# Patient Record
Sex: FEMALE | Race: WHITE | NOT HISPANIC OR LATINO | URBAN - METROPOLITAN AREA
[De-identification: names, ages, dates, MRNs, and addresses within clinical notes are randomized per-mention and may not be internally consistent; named-entity substitution may affect disease eponyms.]

---

## 2019-11-07 ENCOUNTER — ANNUAL EXAM (OUTPATIENT)
Dept: OBGYN CLINIC | Facility: CLINIC | Age: 59
End: 2019-11-07
Payer: COMMERCIAL

## 2019-11-07 VITALS
DIASTOLIC BLOOD PRESSURE: 88 MMHG | SYSTOLIC BLOOD PRESSURE: 126 MMHG | WEIGHT: 192 LBS | HEIGHT: 64 IN | BODY MASS INDEX: 32.78 KG/M2

## 2019-11-07 DIAGNOSIS — Z12.39 BREAST CANCER SCREENING: ICD-10-CM

## 2019-11-07 DIAGNOSIS — Z01.419 ENCOUNTER FOR ANNUAL ROUTINE GYNECOLOGICAL EXAMINATION: Primary | ICD-10-CM

## 2019-11-07 PROCEDURE — 87624 HPV HI-RISK TYP POOLED RSLT: CPT | Performed by: OBSTETRICS & GYNECOLOGY

## 2019-11-07 PROCEDURE — S0610 ANNUAL GYNECOLOGICAL EXAMINA: HCPCS | Performed by: OBSTETRICS & GYNECOLOGY

## 2019-11-07 PROCEDURE — G0145 SCR C/V CYTO,THINLAYER,RESCR: HCPCS | Performed by: OBSTETRICS & GYNECOLOGY

## 2019-11-07 RX ORDER — CETIRIZINE HYDROCHLORIDE 10 MG/1
10 TABLET ORAL DAILY
COMMUNITY
Start: 2018-03-23

## 2019-11-07 RX ORDER — OLOPATADINE HYDROCHLORIDE 665 UG/1
2 SPRAY NASAL 2 TIMES DAILY
COMMUNITY
Start: 2019-04-23 | End: 2021-11-04

## 2019-11-07 RX ORDER — VALACYCLOVIR HYDROCHLORIDE 500 MG/1
500 TABLET, FILM COATED ORAL
COMMUNITY
Start: 2017-09-01

## 2019-11-07 RX ORDER — FLUOXETINE HYDROCHLORIDE 20 MG/1
CAPSULE ORAL
COMMUNITY
Start: 2019-10-23

## 2019-11-07 NOTE — PROGRESS NOTES
Assessment/Plan:  Pap smear done as well as annual   Encouraged self-breast examination as well as calcium supplementation  Continue annual 3D mammogram   Reviewed over-the-counter agents for vaginal atrophy, symptomatic  All questions answered  She will continue to follow-up with her family physician as scheduled  Return to office in 1 year or p r n  No problem-specific Assessment & Plan notes found for this encounter  Diagnoses and all orders for this visit:    Encounter for annual routine gynecological examination  -     Liquid-based pap, screening    Breast cancer screening  -     Mammo screening bilateral w 3d & cad; Future    Other orders  -     FLUoxetine (PROzac) 20 mg capsule  -     valACYclovir (VALTREX) 500 mg tablet; Take 500 mg by mouth  -     Olopatadine HCl 0 6 % SOLN; 2 sprays into each nostril 2 (two) times a day  -     PROCTOSOL HC 2 5 % rectal cream  -     hydrocortisone (ANUSOL-HC) 2 5 % rectal cream; Apply twice a day, as needed  -     cetirizine (ZYRTEC ALLERGY) 10 mg tablet; Take 10 mg by mouth daily          Subjective:      Patient ID: Lena Grayson is a 61 y o  female  HPI     This is a 66-year-old female  presents as a new patient for annual well gyn exam   Patient went through menopause at age 46  She denies any vaginal bleeding or spotting  She was never on hormone replacement therapy  She states she started birth control pills at age 25 and discontinued at age 46  Patient has been in a monogamous relationship for over 21 years  She states her Pap smears have been normal   Last gyn exam was approximately 2 years ago  She does state in her early 21 she underwent cryo surgery  Her gyn history significant for laparoscopy x2, diagnosed with endometriosis in her 25s  She also was diagnosed with interstitial cystitis well over 10 years ago and was using Elmiron instillation with self catheterization  This was prescribed through Dr Sandeep King    She last was treated 2 years ago  Patient does complain of vaginal dryness during intercourse  She does use a lubricant periodically  She underwent a screening colonoscopy 2018 revealing polyp with follow-up in 3 years  She does follow up with her family physician on a regular basis  The following portions of the patient's history were reviewed and updated as appropriate: allergies, current medications, past family history, past medical history, past social history, past surgical history and problem list     Review of Systems   Constitutional: Negative for fatigue, fever and unexpected weight change  Respiratory: Negative for cough, chest tightness, shortness of breath and wheezing  Cardiovascular: Negative  Negative for chest pain and palpitations  Gastrointestinal: Negative  Negative for abdominal distention, abdominal pain, blood in stool, constipation, diarrhea, nausea and vomiting  Genitourinary: Negative  Negative for difficulty urinating, dyspareunia, dysuria, flank pain, frequency, genital sores, hematuria, pelvic pain, urgency, vaginal bleeding, vaginal discharge and vaginal pain  Skin: Negative for rash  Objective:      /88   Ht 5' 4" (1 626 m)   Wt 87 1 kg (192 lb)   Breastfeeding? No   BMI 32 96 kg/m²          Physical Exam   Constitutional: She appears well-developed and well-nourished  Cardiovascular: Normal rate and regular rhythm  Pulmonary/Chest: Effort normal and breath sounds normal  Right breast exhibits no inverted nipple, no mass, no nipple discharge, no skin change and no tenderness  Left breast exhibits no inverted nipple, no mass, no nipple discharge, no skin change and no tenderness  Abdominal: Soft  Bowel sounds are normal  She exhibits no distension  There is no tenderness  There is no rebound and no guarding  Genitourinary: Vagina normal and uterus normal  There is no lesion on the right labia  There is no lesion on the left labia   Cervix exhibits no discharge and no friability  Right adnexum displays no mass, no tenderness and no fullness  Left adnexum displays no mass, no tenderness and no fullness  No vaginal discharge found  Genitourinary Comments: Cervix is noted to be slightly distorted  Cervical os is stenotic  There is no evidence of pelvic floor prolapse  Rectovaginal exam is confirmatory

## 2019-11-11 LAB
HPV HR 12 DNA CVX QL NAA+PROBE: NEGATIVE
HPV16 DNA CVX QL NAA+PROBE: NEGATIVE
HPV18 DNA CVX QL NAA+PROBE: NEGATIVE

## 2019-11-13 LAB
LAB AP GYN PRIMARY INTERPRETATION: NORMAL
Lab: NORMAL

## 2020-12-08 ENCOUNTER — ANNUAL EXAM (OUTPATIENT)
Dept: OBGYN CLINIC | Facility: CLINIC | Age: 60
End: 2020-12-08
Payer: COMMERCIAL

## 2020-12-08 VITALS
HEIGHT: 64 IN | WEIGHT: 199 LBS | SYSTOLIC BLOOD PRESSURE: 138 MMHG | BODY MASS INDEX: 33.97 KG/M2 | DIASTOLIC BLOOD PRESSURE: 82 MMHG

## 2020-12-08 DIAGNOSIS — Z12.39 ENCOUNTER FOR SCREENING FOR MALIGNANT NEOPLASM OF BREAST, UNSPECIFIED SCREENING MODALITY: ICD-10-CM

## 2020-12-08 DIAGNOSIS — Z01.419 ENCOUNTER FOR ANNUAL ROUTINE GYNECOLOGICAL EXAMINATION: Primary | ICD-10-CM

## 2020-12-08 PROCEDURE — 87624 HPV HI-RISK TYP POOLED RSLT: CPT | Performed by: OBSTETRICS & GYNECOLOGY

## 2020-12-08 PROCEDURE — S0612 ANNUAL GYNECOLOGICAL EXAMINA: HCPCS | Performed by: OBSTETRICS & GYNECOLOGY

## 2020-12-08 PROCEDURE — G0145 SCR C/V CYTO,THINLAYER,RESCR: HCPCS | Performed by: OBSTETRICS & GYNECOLOGY

## 2020-12-11 LAB
LAB AP GYN PRIMARY INTERPRETATION: NORMAL
Lab: NORMAL

## 2021-11-05 ENCOUNTER — TELEPHONE (OUTPATIENT)
Dept: HEMATOLOGY ONCOLOGY | Facility: CLINIC | Age: 61
End: 2021-11-05

## 2021-11-08 ENCOUNTER — TELEPHONE (OUTPATIENT)
Dept: HEMATOLOGY ONCOLOGY | Facility: CLINIC | Age: 61
End: 2021-11-08

## 2021-11-17 ENCOUNTER — CONSULT (OUTPATIENT)
Dept: HEMATOLOGY ONCOLOGY | Facility: CLINIC | Age: 61
End: 2021-11-17
Payer: COMMERCIAL

## 2021-11-17 ENCOUNTER — APPOINTMENT (OUTPATIENT)
Dept: LAB | Age: 61
End: 2021-11-17
Payer: COMMERCIAL

## 2021-11-17 VITALS
HEART RATE: 70 BPM | SYSTOLIC BLOOD PRESSURE: 136 MMHG | BODY MASS INDEX: 34.42 KG/M2 | TEMPERATURE: 97.5 F | HEIGHT: 64 IN | OXYGEN SATURATION: 97 % | WEIGHT: 201.6 LBS | RESPIRATION RATE: 16 BRPM | DIASTOLIC BLOOD PRESSURE: 88 MMHG

## 2021-11-17 DIAGNOSIS — M32.9 LUPUS (HCC): ICD-10-CM

## 2021-11-17 DIAGNOSIS — D69.2 PURPURA (HCC): ICD-10-CM

## 2021-11-17 DIAGNOSIS — Z86.2: Primary | ICD-10-CM

## 2021-11-17 DIAGNOSIS — R76.8 ELEVATED ANTINUCLEAR ANTIBODY (ANA) LEVEL: ICD-10-CM

## 2021-11-17 LAB
ALBUMIN SERPL BCP-MCNC: 3.6 G/DL (ref 3.5–5)
ALP SERPL-CCNC: 106 U/L (ref 46–116)
ALT SERPL W P-5'-P-CCNC: 29 U/L (ref 12–78)
ANION GAP SERPL CALCULATED.3IONS-SCNC: 5 MMOL/L (ref 4–13)
AST SERPL W P-5'-P-CCNC: 20 U/L (ref 5–45)
BASOPHILS # BLD AUTO: 0.06 THOUSANDS/ΜL (ref 0–0.1)
BASOPHILS NFR BLD AUTO: 1 % (ref 0–1)
BILIRUB SERPL-MCNC: 0.56 MG/DL (ref 0.2–1)
BUN SERPL-MCNC: 19 MG/DL (ref 5–25)
CALCIUM SERPL-MCNC: 9.1 MG/DL (ref 8.3–10.1)
CHLORIDE SERPL-SCNC: 106 MMOL/L (ref 100–108)
CO2 SERPL-SCNC: 26 MMOL/L (ref 21–32)
CREAT SERPL-MCNC: 0.78 MG/DL (ref 0.6–1.3)
EOSINOPHIL # BLD AUTO: 0.31 THOUSAND/ΜL (ref 0–0.61)
EOSINOPHIL NFR BLD AUTO: 4 % (ref 0–6)
ERYTHROCYTE [DISTWIDTH] IN BLOOD BY AUTOMATED COUNT: 13.1 % (ref 11.6–15.1)
GFR SERPL CREATININE-BSD FRML MDRD: 82 ML/MIN/1.73SQ M
GLUCOSE P FAST SERPL-MCNC: 96 MG/DL (ref 65–99)
HCT VFR BLD AUTO: 42.1 % (ref 34.8–46.1)
HGB BLD-MCNC: 13.7 G/DL (ref 11.5–15.4)
IMM GRANULOCYTES # BLD AUTO: 0.06 THOUSAND/UL (ref 0–0.2)
IMM GRANULOCYTES NFR BLD AUTO: 1 % (ref 0–2)
LYMPHOCYTES # BLD AUTO: 2.05 THOUSANDS/ΜL (ref 0.6–4.47)
LYMPHOCYTES NFR BLD AUTO: 24 % (ref 14–44)
MCH RBC QN AUTO: 31.2 PG (ref 26.8–34.3)
MCHC RBC AUTO-ENTMCNC: 32.5 G/DL (ref 31.4–37.4)
MCV RBC AUTO: 96 FL (ref 82–98)
MONOCYTES # BLD AUTO: 0.94 THOUSAND/ΜL (ref 0.17–1.22)
MONOCYTES NFR BLD AUTO: 11 % (ref 4–12)
NEUTROPHILS # BLD AUTO: 5.23 THOUSANDS/ΜL (ref 1.85–7.62)
NEUTS SEG NFR BLD AUTO: 59 % (ref 43–75)
NRBC BLD AUTO-RTO: 0 /100 WBCS
PLATELET # BLD AUTO: 246 THOUSANDS/UL (ref 149–390)
PMV BLD AUTO: 10.6 FL (ref 8.9–12.7)
POTASSIUM SERPL-SCNC: 4.2 MMOL/L (ref 3.5–5.3)
PROT SERPL-MCNC: 8.2 G/DL (ref 6.4–8.2)
RBC # BLD AUTO: 4.39 MILLION/UL (ref 3.81–5.12)
SODIUM SERPL-SCNC: 137 MMOL/L (ref 136–145)
WBC # BLD AUTO: 8.65 THOUSAND/UL (ref 4.31–10.16)

## 2021-11-17 PROCEDURE — 80053 COMPREHEN METABOLIC PANEL: CPT

## 2021-11-17 PROCEDURE — 85025 COMPLETE CBC W/AUTO DIFF WBC: CPT

## 2021-11-17 PROCEDURE — 36415 COLL VENOUS BLD VENIPUNCTURE: CPT

## 2021-11-17 PROCEDURE — 99245 OFF/OP CONSLTJ NEW/EST HI 55: CPT | Performed by: INTERNAL MEDICINE

## 2022-03-02 ENCOUNTER — ANNUAL EXAM (OUTPATIENT)
Dept: OBGYN CLINIC | Facility: CLINIC | Age: 62
End: 2022-03-02
Payer: COMMERCIAL

## 2022-03-02 VITALS
SYSTOLIC BLOOD PRESSURE: 128 MMHG | WEIGHT: 198 LBS | DIASTOLIC BLOOD PRESSURE: 84 MMHG | BODY MASS INDEX: 33.8 KG/M2 | HEIGHT: 64 IN

## 2022-03-02 DIAGNOSIS — N39.46 MIXED STRESS AND URGE URINARY INCONTINENCE: ICD-10-CM

## 2022-03-02 DIAGNOSIS — Z01.419 ENCOUNTER FOR ANNUAL ROUTINE GYNECOLOGICAL EXAMINATION: Primary | ICD-10-CM

## 2022-03-02 DIAGNOSIS — Z12.31 ENCOUNTER FOR SCREENING MAMMOGRAM FOR BREAST CANCER: ICD-10-CM

## 2022-03-02 DIAGNOSIS — N95.2 VAGINAL ATROPHY: ICD-10-CM

## 2022-03-02 DIAGNOSIS — N94.10 DYSPAREUNIA IN FEMALE: ICD-10-CM

## 2022-03-02 PROCEDURE — S0612 ANNUAL GYNECOLOGICAL EXAMINA: HCPCS | Performed by: OBSTETRICS & GYNECOLOGY

## 2022-03-02 RX ORDER — TOLTERODINE TARTRATE 1 MG/1
1 TABLET, EXTENDED RELEASE ORAL 2 TIMES DAILY
Qty: 30 TABLET | Refills: 1 | Status: SHIPPED | OUTPATIENT
Start: 2022-03-02 | End: 2022-04-25 | Stop reason: SDUPTHER

## 2022-03-02 NOTE — PROGRESS NOTES
Assessment/Plan:  Pap smear deferred due to low risk status  Encouraged self-breast examination as well as calcium supplementation  Continue annual mammogram   Discussed colon cancer screening, due for colonoscopy this year  Discussed treatment options for symptomatic vaginal atrophy/dyspareunia  She was given samples of vaginal moisturizer, Revaree  She will also use lubricant during intercourse  Discussed treatment options for mixed urinary incontinence  She will try Detrol over the next 6-8 weeks in call with update  Return to office in 1 year or p r n  No problem-specific Assessment & Plan notes found for this encounter  Diagnoses and all orders for this visit:    Encounter for annual routine gynecological examination    Encounter for screening mammogram for breast cancer  -     Mammo screening bilateral w 3d & cad; Future    Mixed stress and urge urinary incontinence  -     tolterodine (DETROL) 1 mg tablet; Take 1 tablet (1 mg total) by mouth 2 (two) times a day    Vaginal atrophy    Dyspareunia in female          Subjective:      Patient ID: Fracisco Lacy is a 64 y o  female  HPI     This is a pleasant 80-year-old female  presents for gyn exam   She went through menopause at age 46  She denies any vaginal bleeding or spotting  Patient has never been on hormone replacement therapy  She was on OCPs from age 25 to age 46  She has been in a monogamous relationship for over 23 years  She has noticed significant dryness and discomfort with intercourse  Pap smears have been normal   She underwent cryo surgery of cervix age 21 as well as laparoscopy x2 diagnosed with endometriosis in her 25s  She has had increased episodes of urinary incontinence associated with stress as well as urgency requiring wearing a pad on a daily basis      The following portions of the patient's history were reviewed and updated as appropriate: allergies, current medications, past family history, past medical history, past social history, past surgical history and problem list     Review of Systems   Constitutional: Negative for fatigue, fever and unexpected weight change  Respiratory: Negative for cough, chest tightness, shortness of breath and wheezing  Cardiovascular: Negative  Negative for chest pain and palpitations  Gastrointestinal: Negative  Negative for abdominal distention, abdominal pain, blood in stool, constipation, diarrhea, nausea and vomiting  Genitourinary: Negative  Negative for difficulty urinating, dyspareunia, dysuria, flank pain, frequency, genital sores, hematuria, pelvic pain, urgency, vaginal bleeding, vaginal discharge and vaginal pain  Skin: Negative for rash  Objective:      /84   Ht 5' 4" (1 626 m)   Wt 89 8 kg (198 lb)   BMI 33 99 kg/m²          Physical Exam  Constitutional:       Appearance: Normal appearance  She is well-developed  Cardiovascular:      Rate and Rhythm: Normal rate and regular rhythm  Pulmonary:      Effort: Pulmonary effort is normal       Breath sounds: Normal breath sounds  Chest:   Breasts:      Right: No inverted nipple, mass, nipple discharge, skin change or tenderness  Left: No inverted nipple, mass, nipple discharge, skin change or tenderness  Abdominal:      General: Bowel sounds are normal  There is no distension  Palpations: Abdomen is soft  Tenderness: There is no abdominal tenderness  There is no guarding or rebound  Genitourinary:     Labia:         Right: No rash, tenderness or lesion  Left: No rash, tenderness or lesion  Vagina: Normal  No signs of injury  No vaginal discharge or tenderness  Cervix: No cervical motion tenderness, discharge, friability, lesion, erythema or cervical bleeding  Uterus: Not enlarged and not tender  Adnexa:         Right: No mass, tenderness or fullness  Left: No mass, tenderness or fullness          Comments: External genitalia is within normal limits  The vagina is evident of estrogen deficiency  Cervix is small the os is stenotic  There is no pelvic floor prolapse  Neurological:      Mental Status: She is alert and oriented to person, place, and time     Psychiatric:         Behavior: Behavior normal

## 2022-04-25 ENCOUNTER — TELEPHONE (OUTPATIENT)
Dept: OBGYN CLINIC | Facility: CLINIC | Age: 62
End: 2022-04-25

## 2022-04-25 DIAGNOSIS — N39.46 MIXED STRESS AND URGE URINARY INCONTINENCE: ICD-10-CM

## 2022-04-25 RX ORDER — TOLTERODINE TARTRATE 1 MG/1
1 TABLET, EXTENDED RELEASE ORAL 2 TIMES DAILY
Qty: 60 TABLET | Refills: 0 | Status: SHIPPED | OUTPATIENT
Start: 2022-04-25 | End: 2022-05-24

## 2022-04-25 NOTE — TELEPHONE ENCOUNTER
Pt calling for rf Detrol which is effective - [facility] 3/2/2022 was for #30, rf x 1  Lm pt's as

## 2022-04-25 NOTE — TELEPHONE ENCOUNTER
Pt sig on Detrol BID - presc was for #30, rf x 1 which pt has completed  Continue BID?  If so please sign off on presc for same to [facility]) & ADD REFILLS

## 2022-05-24 DIAGNOSIS — N39.46 MIXED STRESS AND URGE URINARY INCONTINENCE: ICD-10-CM

## 2022-05-24 RX ORDER — TOLTERODINE TARTRATE 1 MG/1
TABLET, EXTENDED RELEASE ORAL
Qty: 60 TABLET | Refills: 0 | Status: SHIPPED | OUTPATIENT
Start: 2022-05-24 | End: 2022-06-20

## 2022-06-20 DIAGNOSIS — N39.46 MIXED STRESS AND URGE URINARY INCONTINENCE: ICD-10-CM

## 2022-06-20 RX ORDER — TOLTERODINE TARTRATE 1 MG/1
TABLET, EXTENDED RELEASE ORAL
Qty: 60 TABLET | Refills: 0 | Status: SHIPPED | OUTPATIENT
Start: 2022-06-20 | End: 2022-07-23

## 2022-07-18 DIAGNOSIS — N39.46 MIXED STRESS AND URGE URINARY INCONTINENCE: ICD-10-CM

## 2022-07-23 RX ORDER — TOLTERODINE TARTRATE 1 MG/1
TABLET, EXTENDED RELEASE ORAL
Qty: 60 TABLET | Refills: 0 | Status: SHIPPED | OUTPATIENT
Start: 2022-07-23

## 2023-03-07 ENCOUNTER — ANNUAL EXAM (OUTPATIENT)
Dept: OBGYN CLINIC | Facility: CLINIC | Age: 63
End: 2023-03-07

## 2023-03-07 VITALS
SYSTOLIC BLOOD PRESSURE: 128 MMHG | HEIGHT: 64 IN | BODY MASS INDEX: 33.97 KG/M2 | DIASTOLIC BLOOD PRESSURE: 80 MMHG | WEIGHT: 199 LBS

## 2023-03-07 DIAGNOSIS — Z12.31 ENCOUNTER FOR SCREENING MAMMOGRAM FOR BREAST CANCER: ICD-10-CM

## 2023-03-07 DIAGNOSIS — N39.46 MIXED STRESS AND URGE URINARY INCONTINENCE: ICD-10-CM

## 2023-03-07 DIAGNOSIS — Z01.419 ENCOUNTER FOR ANNUAL ROUTINE GYNECOLOGICAL EXAMINATION: Primary | ICD-10-CM

## 2023-03-07 NOTE — PROGRESS NOTES
Assessment/Plan:   Pap done for cytology  Encourage self breast examination as well as calcium supplementation  Continue annual mammogram   Reviewed colon cancer screening  She is scheduling follow-up with colorectal, history of seizure as well as due for screening colonoscopy  Reviewed mixed urinary incontinence symptoms  Offered further evaluation with urogynecology  All questions answered  Return to office in 1 year or as needed   No problem-specific Assessment & Plan notes found for this encounter  Diagnoses and all orders for this visit:    Encounter for annual routine gynecological examination    Encounter for screening mammogram for breast cancer  -     Mammo screening bilateral w 3d & cad; Future    Mixed stress and urge urinary incontinence  -     Ambulatory Referral to Urogynecology; Future    Other orders  -     VITAMIN D PO; Take by mouth          Subjective:      Patient ID: Vel Reynolds is a 58 y o  female  HPI     This is a pleasant 69-year-old female P0 presents for GYN exam   She went through menopause at age 46  She has never been on hormone replacement therapy  She denies any vaginal bleeding or spotting  No changes in bowel function  She has been having issues with urinary incontinence associated with urgency, stress, spontaneous over the last several years  She has been in a monogamous relationship for over 24 years  GYN history significant for cryosurgery of cervix at age 21 as well as laparoscopy x2 diagnosed with endometriosis  Mammogram 10/2022, normal  Colonoscopy 5 years ago with polyp noted, due for colonoscopy this year  The following portions of the patient's history were reviewed and updated as appropriate: allergies, current medications, past family history, past medical history, past social history, past surgical history and problem list     Review of Systems   Constitutional: Negative for fatigue, fever and unexpected weight change     Respiratory: Negative for cough, chest tightness, shortness of breath and wheezing  Cardiovascular: Negative  Negative for chest pain and palpitations  Gastrointestinal: Negative  Negative for abdominal distention, abdominal pain, blood in stool, constipation, diarrhea, nausea and vomiting  Genitourinary: Negative  Negative for difficulty urinating, dyspareunia, dysuria, flank pain, frequency, genital sores, hematuria, pelvic pain, urgency, vaginal bleeding, vaginal discharge and vaginal pain  Skin: Negative for rash  Objective:      /80   Ht 5' 4" (1 626 m)   Wt 90 3 kg (199 lb)   BMI 34 16 kg/m²          Physical Exam  Constitutional:       Appearance: Normal appearance  She is well-developed  Cardiovascular:      Rate and Rhythm: Normal rate and regular rhythm  Pulmonary:      Effort: Pulmonary effort is normal       Breath sounds: Normal breath sounds  Chest:   Breasts:     Right: No inverted nipple, mass, nipple discharge, skin change or tenderness  Left: No inverted nipple, mass, nipple discharge, skin change or tenderness  Abdominal:      General: Bowel sounds are normal  There is no distension  Palpations: Abdomen is soft  Tenderness: There is no abdominal tenderness  There is no guarding or rebound  Genitourinary:     Labia:         Right: No rash, tenderness or lesion  Left: No rash, tenderness or lesion  Vagina: Normal  No signs of injury  No vaginal discharge or tenderness  Cervix: No cervical motion tenderness, discharge, friability, lesion, erythema or cervical bleeding  Uterus: Not enlarged and not tender  Adnexa:         Right: No mass, tenderness or fullness  Left: No mass, tenderness or fullness  Neurological:      Mental Status: She is alert  Psychiatric:         Behavior: Behavior normal        External genitalia is within normal limits  The vagina is evident of slight estrogen deficiency    Cervix is small the os is stenotic  No pelvic floor prolapse

## 2023-03-14 LAB
LAB AP GYN PRIMARY INTERPRETATION: NORMAL
Lab: NORMAL

## 2024-03-29 RX ORDER — AMLODIPINE BESYLATE 2.5 MG/1
2.5 TABLET ORAL DAILY
COMMUNITY

## 2024-03-29 NOTE — PRE-PROCEDURE INSTRUCTIONS
Pre-Surgery Instructions:   Medication Instructions    amLODIPine (NORVASC) 2.5 mg tablet Take day of surgery.    Aspirin Low Dose 81 MG EC tablet Instructions provided by MD-Pt will obtain hold instructions from prescribing MD    clopidogrel (PLAVIX) 75 mg tablet Instructions provided by MD-Pt will obtain hold instructions from prescribing MD    FLUoxetine (PROzac) 20 mg capsule Take night before surgery    nitroglycerin (NITROSTAT) 0.4 mg SL tablet Uses PRN- OK to take day of surgery    Olopatadine-Mometasone 665-25 MCG/ACT SUSP Uses PRN- OK to take day of surgery    pantoprazole (PROTONIX) 40 mg tablet Take day of surgery.    rosuvastatin (CRESTOR) 10 MG tablet Take night before surgery   Medication instructions for day surgery reviewed. Please use only a sip of water to take your instructed medications. Avoid all over the counter vitamins, supplements and NSAIDS for one week prior to surgery per anesthesia guidelines. Tylenol is ok to take as needed.     You will receive a call one business day prior to surgery with an arrival time and hospital directions. If your surgery is scheduled on a Monday, the hospital will be calling you on the Friday prior to your surgery. If you have not heard from anyone by 8pm, please call the hospital supervisor through the hospital  at [phone]. ([place] [phone] or [place] [phone]).    Do not eat or drink anything after midnight the night before your surgery, including candy, mints, lifesavers, or chewing gum. Do not drink alcohol 24hrs before your surgery. Try not to smoke at least 24hrs before your surgery.       Follow the pre surgery showering instructions as listed in the “My Surgical Experience Booklet” or otherwise provided by your surgeon's office. Do not use a blade to shave the surgical area 1 week before surgery. It is okay to use a clean electric clippers up to 24 hours before surgery. Do not apply any lotions, creams, including makeup, cologne,  deodorant, or perfumes after showering on the day of your surgery. Do not use dry shampoo, hair spray, hair gel, or any type of hair products.     No contact lenses, eye make-up, or artificial eyelashes. Remove nail polish, including gel polish, and any artificial, gel, or acrylic nails if possible. Remove all jewelry including rings and body piercing jewelry.     Wear causal clothing that is easy to take on and off. Consider your type of surgery.    Keep any valuables, jewelry, piercings at home. Please bring any specially ordered equipment (sling, braces) if indicated.    Arrange for a responsible person to drive you to and from the hospital on the day of your surgery. Please confirm the visitor policy for the day of your procedure when you receive your phone call with an arrival time.     Call the surgeon's office with any new illnesses, exposures, or additional questions prior to surgery.    Please reference your “My Surgical Experience Booklet” for additional information to prepare for your upcoming surgery.

## 2024-04-02 ENCOUNTER — ANESTHESIA EVENT (OUTPATIENT)
Dept: PERIOP | Facility: HOSPITAL | Age: 64
End: 2024-04-02
Payer: COMMERCIAL

## 2024-04-09 PROBLEM — IMO0002 LUPUS: Status: ACTIVE | Noted: 2024-04-09

## 2024-04-09 PROBLEM — M32.9 LUPUS (HCC): Status: ACTIVE | Noted: 2024-04-09

## 2024-04-09 PROBLEM — I25.10 CORONARY ARTERY DISEASE INVOLVING NATIVE CORONARY ARTERY: Status: ACTIVE | Noted: 2024-04-09

## 2024-04-09 PROBLEM — R11.2 PONV (POSTOPERATIVE NAUSEA AND VOMITING): Status: ACTIVE | Noted: 2024-04-09

## 2024-04-09 PROBLEM — Z98.890 PONV (POSTOPERATIVE NAUSEA AND VOMITING): Status: ACTIVE | Noted: 2024-04-09

## 2024-04-09 PROBLEM — G47.30 SLEEP APNEA: Status: ACTIVE | Noted: 2024-04-09

## 2024-04-09 PROBLEM — Z99.89 CPAP (CONTINUOUS POSITIVE AIRWAY PRESSURE) DEPENDENCE: Status: ACTIVE | Noted: 2024-04-09

## 2024-04-10 ENCOUNTER — HOSPITAL ENCOUNTER (OUTPATIENT)
Facility: HOSPITAL | Age: 64
Setting detail: OUTPATIENT SURGERY
Discharge: HOME | End: 2024-04-10
Attending: OBSTETRICS & GYNECOLOGY | Admitting: OBSTETRICS & GYNECOLOGY
Payer: COMMERCIAL

## 2024-04-10 ENCOUNTER — ANESTHESIA (OUTPATIENT)
Dept: PERIOP | Facility: HOSPITAL | Age: 64
End: 2024-04-10
Payer: COMMERCIAL

## 2024-04-10 VITALS
OXYGEN SATURATION: 96 % | BODY MASS INDEX: 34.25 KG/M2 | WEIGHT: 200.62 LBS | TEMPERATURE: 97 F | SYSTOLIC BLOOD PRESSURE: 147 MMHG | DIASTOLIC BLOOD PRESSURE: 84 MMHG | HEART RATE: 74 BPM | RESPIRATION RATE: 16 BRPM | HEIGHT: 64 IN

## 2024-04-10 PROBLEM — N39.3 STRESS INCONTINENCE (FEMALE) (MALE): Status: ACTIVE | Noted: 2024-04-10

## 2024-04-10 PROBLEM — N36.41 HYPERMOBILITY OF URETHRA: Status: ACTIVE | Noted: 2024-04-10

## 2024-04-10 PROCEDURE — NC001 PR NO CHARGE: Performed by: STUDENT IN AN ORGANIZED HEALTH CARE EDUCATION/TRAINING PROGRAM

## 2024-04-10 PROCEDURE — C1771 REP DEV, URINARY, W/SLING: HCPCS | Performed by: OBSTETRICS & GYNECOLOGY

## 2024-04-10 DEVICE — CONTINENCE SYSTEM
Type: IMPLANTABLE DEVICE | Site: VAGINA | Status: FUNCTIONAL
Brand: GYNECARE TVT ABBREVO

## 2024-04-10 RX ORDER — LIDOCAINE HYDROCHLORIDE 20 MG/ML
INJECTION, SOLUTION EPIDURAL; INFILTRATION; INTRACAUDAL; PERINEURAL AS NEEDED
Status: DISCONTINUED | OUTPATIENT
Start: 2024-04-10 | End: 2024-04-10

## 2024-04-10 RX ORDER — PROPOFOL 10 MG/ML
INJECTION, EMULSION INTRAVENOUS AS NEEDED
Status: DISCONTINUED | OUTPATIENT
Start: 2024-04-10 | End: 2024-04-10

## 2024-04-10 RX ORDER — MAGNESIUM HYDROXIDE 1200 MG/15ML
LIQUID ORAL AS NEEDED
Status: DISCONTINUED | OUTPATIENT
Start: 2024-04-10 | End: 2024-04-10 | Stop reason: HOSPADM

## 2024-04-10 RX ORDER — ONDANSETRON 2 MG/ML
4 INJECTION INTRAMUSCULAR; INTRAVENOUS EVERY 6 HOURS PRN
Status: DISCONTINUED | OUTPATIENT
Start: 2024-04-10 | End: 2024-04-10 | Stop reason: HOSPADM

## 2024-04-10 RX ORDER — HYDROMORPHONE HCL/PF 1 MG/ML
0.5 SYRINGE (ML) INJECTION
Status: DISCONTINUED | OUTPATIENT
Start: 2024-04-10 | End: 2024-04-10 | Stop reason: HOSPADM

## 2024-04-10 RX ORDER — PROPOFOL 10 MG/ML
INJECTION, EMULSION INTRAVENOUS CONTINUOUS PRN
Status: DISCONTINUED | OUTPATIENT
Start: 2024-04-10 | End: 2024-04-10

## 2024-04-10 RX ORDER — PROMETHAZINE HYDROCHLORIDE 25 MG/ML
6.25 INJECTION, SOLUTION INTRAMUSCULAR; INTRAVENOUS ONCE AS NEEDED
Status: DISCONTINUED | OUTPATIENT
Start: 2024-04-10 | End: 2024-04-10 | Stop reason: HOSPADM

## 2024-04-10 RX ORDER — ACETAMINOPHEN 325 MG/1
975 TABLET ORAL EVERY 6 HOURS PRN
Status: DISCONTINUED | OUTPATIENT
Start: 2024-04-10 | End: 2024-04-10 | Stop reason: HOSPADM

## 2024-04-10 RX ORDER — KETAMINE HCL IN NACL, ISO-OSM 100MG/10ML
SYRINGE (ML) INJECTION AS NEEDED
Status: DISCONTINUED | OUTPATIENT
Start: 2024-04-10 | End: 2024-04-10

## 2024-04-10 RX ORDER — ONDANSETRON 2 MG/ML
INJECTION INTRAMUSCULAR; INTRAVENOUS AS NEEDED
Status: DISCONTINUED | OUTPATIENT
Start: 2024-04-10 | End: 2024-04-10

## 2024-04-10 RX ORDER — FENTANYL CITRATE/PF 50 MCG/ML
50 SYRINGE (ML) INJECTION
Status: DISCONTINUED | OUTPATIENT
Start: 2024-04-10 | End: 2024-04-10 | Stop reason: HOSPADM

## 2024-04-10 RX ORDER — ONDANSETRON 2 MG/ML
4 INJECTION INTRAMUSCULAR; INTRAVENOUS ONCE AS NEEDED
Status: DISCONTINUED | OUTPATIENT
Start: 2024-04-10 | End: 2024-04-10 | Stop reason: HOSPADM

## 2024-04-10 RX ORDER — FENTANYL CITRATE 50 UG/ML
INJECTION, SOLUTION INTRAMUSCULAR; INTRAVENOUS AS NEEDED
Status: DISCONTINUED | OUTPATIENT
Start: 2024-04-10 | End: 2024-04-10

## 2024-04-10 RX ORDER — CEFAZOLIN SODIUM 2 G/50ML
2000 SOLUTION INTRAVENOUS ONCE
Status: COMPLETED | OUTPATIENT
Start: 2024-04-10 | End: 2024-04-10

## 2024-04-10 RX ORDER — DEXAMETHASONE SODIUM PHOSPHATE 10 MG/ML
INJECTION, SOLUTION INTRAMUSCULAR; INTRAVENOUS AS NEEDED
Status: DISCONTINUED | OUTPATIENT
Start: 2024-04-10 | End: 2024-04-10

## 2024-04-10 RX ORDER — PROMETHAZINE HYDROCHLORIDE 25 MG/ML
6.25 INJECTION, SOLUTION INTRAMUSCULAR; INTRAVENOUS ONCE AS NEEDED
Status: DISCONTINUED | OUTPATIENT
Start: 2024-04-10 | End: 2024-04-10

## 2024-04-10 RX ORDER — SODIUM CHLORIDE 9 MG/ML
125 INJECTION, SOLUTION INTRAVENOUS CONTINUOUS
Status: DISCONTINUED | OUTPATIENT
Start: 2024-04-10 | End: 2024-04-10 | Stop reason: HOSPADM

## 2024-04-10 RX ORDER — IBUPROFEN 600 MG/1
600 TABLET ORAL EVERY 6 HOURS PRN
Status: DISCONTINUED | OUTPATIENT
Start: 2024-04-10 | End: 2024-04-10 | Stop reason: HOSPADM

## 2024-04-10 RX ORDER — ACETAMINOPHEN 325 MG/1
975 TABLET ORAL ONCE
Status: COMPLETED | OUTPATIENT
Start: 2024-04-10 | End: 2024-04-10

## 2024-04-10 RX ORDER — PHENAZOPYRIDINE HYDROCHLORIDE 100 MG/1
100 TABLET, FILM COATED ORAL ONCE
Status: COMPLETED | OUTPATIENT
Start: 2024-04-10 | End: 2024-04-10

## 2024-04-10 RX ORDER — MEPERIDINE HYDROCHLORIDE 25 MG/ML
12.5 INJECTION INTRAMUSCULAR; INTRAVENOUS; SUBCUTANEOUS ONCE AS NEEDED
Status: DISCONTINUED | OUTPATIENT
Start: 2024-04-10 | End: 2024-04-10 | Stop reason: HOSPADM

## 2024-04-10 RX ADMIN — PROPOFOL 10 MG: 10 INJECTION, EMULSION INTRAVENOUS at 08:00

## 2024-04-10 RX ADMIN — LIDOCAINE HYDROCHLORIDE 40 MG: 20 INJECTION, SOLUTION EPIDURAL; INFILTRATION; INTRACAUDAL at 07:29

## 2024-04-10 RX ADMIN — CEFAZOLIN SODIUM 2000 MG: 2 SOLUTION INTRAVENOUS at 07:25

## 2024-04-10 RX ADMIN — ONDANSETRON 4 MG: 2 INJECTION INTRAMUSCULAR; INTRAVENOUS at 07:35

## 2024-04-10 RX ADMIN — Medication 10 MG: at 07:38

## 2024-04-10 RX ADMIN — PROPOFOL 20 MG: 10 INJECTION, EMULSION INTRAVENOUS at 07:29

## 2024-04-10 RX ADMIN — PHENAZOPYRIDINE 100 MG: 100 TABLET ORAL at 06:05

## 2024-04-10 RX ADMIN — PROPOFOL 50 MCG/KG/MIN: 10 INJECTION, EMULSION INTRAVENOUS at 07:29

## 2024-04-10 RX ADMIN — SODIUM CHLORIDE 125 ML/HR: 0.9 INJECTION, SOLUTION INTRAVENOUS at 06:18

## 2024-04-10 RX ADMIN — FENTANYL CITRATE 50 MCG: 50 INJECTION INTRAMUSCULAR; INTRAVENOUS at 07:20

## 2024-04-10 RX ADMIN — Medication 20 MG: at 07:43

## 2024-04-10 RX ADMIN — FENTANYL CITRATE 25 MCG: 50 INJECTION INTRAMUSCULAR; INTRAVENOUS at 07:43

## 2024-04-10 RX ADMIN — DEXAMETHASONE SODIUM PHOSPHATE 10 MG: 10 INJECTION INTRAMUSCULAR; INTRAVENOUS at 07:32

## 2024-04-10 RX ADMIN — PROPOFOL 30 MG: 10 INJECTION, EMULSION INTRAVENOUS at 07:43

## 2024-04-10 RX ADMIN — PROPOFOL 20 MG: 10 INJECTION, EMULSION INTRAVENOUS at 07:59

## 2024-04-10 RX ADMIN — Medication 20 MG: at 07:29

## 2024-04-10 RX ADMIN — ACETAMINOPHEN 325MG 975 MG: 325 TABLET ORAL at 06:05

## 2024-04-10 RX ADMIN — FENTANYL CITRATE 25 MCG: 50 INJECTION INTRAMUSCULAR; INTRAVENOUS at 07:35

## 2024-04-10 NOTE — ANESTHESIA POSTPROCEDURE EVALUATION
Post-Op Assessment Note    CV Status:  Stable  Pain Score: 1    Pain management: adequate       Mental Status:  Alert and awake   Hydration Status:  Euvolemic   PONV Controlled:  Controlled   Airway Patency:  Patent     Post Op Vitals Reviewed: Yes    No anethesia notable event occurred.    Staff: Anesthesiologist               /82 (04/10/24 0814)    Temp (!) 97.3 °F (36.3 °C) (04/10/24 0814)    Pulse 75 (04/10/24 0814)   Resp 16 (04/10/24 0814)    SpO2 99 % (04/10/24 0814)

## 2024-04-10 NOTE — OP NOTE
OPERATIVE REPORT  PATIENT NAME: Cecilia Hernandez    :  1960  MRN: [id number]  Pt Location: [facility] OR ROOM 06    SURGERY DATE: 4/10/2024    Surgeons and Role:     * Valerio Rothman MD - Primary     * Tom Peters MD - First Assistant - role during surgery: retraction, suctioning irrigating, cleansing field with sponge, suturing, cutting sutures, adjusting light, following primary surgeon with suture handling    Preop Diagnosis:  Stress incontinence (female) (male) [N39.3]  Hypermobility of urethra [N36.41]    Post-Op Diagnosis Codes:     * Stress incontinence (female) (male) [N39.3]     * Hypermobility of urethra [N36.41]    Procedure(s):  PV SLING; EUA  CYSTO    Specimen(s):  * No specimens in log *    Estimated Blood Loss:   Minimal    Drains:  * No LDAs found *    Anesthesia Type:   IV Sedation with Anesthesia    Operative Indications:  Stress incontinence (female) (male) [N39.3]  Hypermobility of urethra [N36.41]    Operative Findings:  Moderate vaginal atrophy  2.  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 Efflux visualized from bilateral ureteral orifices.     Complications:   None    Procedure and Technique:  The patient was properly identified in the holding area by the operating room staff and attending physician. Appropriate antibiotics as per ACOG recommendations were given. She was taken to the operating room where anesthesia was instituted without complication. She was placed in the dorsal lithotomy position with her legs in Gualberto stirrups with care taken to avoid excessive flexion or extension of her lower extremities. Time-out was taken. The patient was again properly identified by the operating room staff and operating physician. At this time, the patient was prepped and draped in normal sterile fashion. We inserted the Lora catheter under sterile conditions for intermittent bladder drainage.     A muriel was made in the inguinal fold  lateral to the clitoris. The skin and subcutaneous tissue at these markings was infiltrated with a dilute Marcaine solution with epinephrine. The same solution was used to infiltrate the anterior vaginal wall at the level of mid urethra, and this infiltration extended out periurethrally bilaterally to the level of the obturator membranes. A 2-cm suburethral incision was then made vertically in the midline at the level of mid urethra, and periurethral tunnels were dissected out sharply with Metzenbaum scissors.     The winged guide was placed in the right periurethral tunnel. The tip of the helical trocar was placed in the hollow of the winged guide in the right periurethral tunnel and advanced laterally until the obturator membrane was perforated. The winged guide was removed. Then, using a rotating and levering motion towards the midline with the handle of the device, the tip of the device was brought around the ischiopubic ramus and out to the skin at the previously made muriel. A stab incision was made in the skin to allow the tip of the device to exit the skin. Examination of the sulcus of the vagina revealed no buttonholing. This procedure was repeated on the patient's left side.     The Lora catheter was then removed, and cystoscopy was performed revealing no perforation or foreign body. Efflux of urine was seen coming from both ureteral orifices. Then, 300 mL of fluid were left in the patient's bladder, and the cystoscope was removed. The ends of the sling were pulled out through the groin skin incisions and advanced until the midportion of the sling was lying loosely in the patient's vagina. We adjusted the sling until little to no leakage was seen coming from the urethral meatus with patient cough. A hemostat was then placed between the sling and the periurethral tissue to stabilize it in place while the plastic sheath covering the sling was removed to prevent excessive tightening of the sling. The centering  tab was cut away, and the adjusting Prolene loops were removed from each end of the sling. The suburethral incision was closed with a running locked suture of 2-0 Vicryl for excellent hemostasis. The two groin skin incisions were closed with Exophyn. Dr. Rothman was present for the entire procedure. There was no qualified resident to assist with the procedure.    I was present for the entire procedure.    Patient Disposition:  PACU         SIGNATURE: Tom Peters MD  DATE: April 10, 2024  TIME: 8:12 AM

## 2024-08-27 ENCOUNTER — ANNUAL EXAM (OUTPATIENT)
Dept: OBGYN CLINIC | Facility: CLINIC | Age: 64
End: 2024-08-27
Payer: COMMERCIAL

## 2024-08-27 VITALS — SYSTOLIC BLOOD PRESSURE: 128 MMHG | DIASTOLIC BLOOD PRESSURE: 74 MMHG | WEIGHT: 206.6 LBS | BODY MASS INDEX: 35.46 KG/M2

## 2024-08-27 DIAGNOSIS — Z12.31 ENCOUNTER FOR SCREENING MAMMOGRAM FOR BREAST CANCER: Primary | ICD-10-CM

## 2024-08-27 DIAGNOSIS — Z01.419 WOMEN'S ANNUAL ROUTINE GYNECOLOGICAL EXAMINATION: ICD-10-CM

## 2024-08-27 PROCEDURE — S0612 ANNUAL GYNECOLOGICAL EXAMINA: HCPCS | Performed by: OBSTETRICS & GYNECOLOGY

## 2024-08-27 NOTE — PATIENT INSTRUCTIONS
The patient was informed of a stable menopausal GYN examination.  The bag be some vaginal atrophy.  She is having problems with intercourse being dry.  Using lubricant.  She will do this.  If no improvement we may consider hormone replacement therapy.  She had a vaginal sling procedure done earlier this year.  She is not happy with it they may also consider putting her on topical vaginal estrogen as to promote the healing.  She was reminded to make an appointment for follow-up with them.  She continue getting her yearly mammograms.  She needs to make an appointment for her colonoscopy.  She will continue getting yearly mammograms.  She should return to our office in 1 year unless new issues occur.

## 2024-08-27 NOTE — PROGRESS NOTES
Ambulatory Visit  Name: Cecilia Hernandez      : 1960      MRN: [id number]  Encounter Provider: Duane Hu MD  Encounter Date: 2024   Encounter department: OB GYN [facility]    Assessment & Plan   1. Encounter for screening mammogram for breast cancer  2. Women's annual routine gynecological examination    The patient was informed of a stable menopausal GYN examination.  She was complaining of vaginal dryness with intimacy.  I strongly suggest lubricant.  If that is not effective we may consider hormone replacement therapy she will keep me informed.  She needs to go back to the urogynecology for evaluation she states she still dribbles has not completely dry since her vaginal sling 4 months ago.  She is try to work on her weight.  She feels safe at home.  She sees a dentist on a regular basis.  She is reminded to make arrangements for colonoscopy which is due again her last one was 5 years ago.  Family history reviewed medication list reviewed.  She is taking blood pressure medicine Prozac and Crestor they are responding well.  She still gainfully employed at  [employer].  History of Present Illness     Cecilia Hernandez is a 64 y.o. female who presents for her annual GYN examination.  She still sexually active.  She is compliant with probably vaginal dryness.  We talked about over-the-counter lubricants.  She may try online lubricants.  She will keep me informed how she does with that.  She had a vaginal sling procedure done in April of this year by urogyn.  She states she still has some problems with inability to tell when she is voiding.  But the stress incontinence has improved.  I remind her to go back for follow-up and let them know what she is feeling.  She is content with her weight would like to lose more she feels safe at home.  She sees a dentist on a regular basis.  She denies any problem depression anxiety.  She is due for colonoscopy soon.  Her last colonoscopy 5 years ago.  She does not  need a Pap smear today    Review of Systems   Genitourinary:         Planning of vaginal dryness with intercourse   All other systems reviewed and are negative.      Objective     /74   Wt 93.7 kg (206 lb 9.6 oz)   BMI 35.46 kg/m²     Physical Exam  Vitals reviewed. Exam conducted with a chaperone present.   Constitutional:       Appearance: Normal appearance.   HENT:      Head: Normocephalic and atraumatic.      Nose: Nose normal.      Mouth/Throat:      Mouth: Mucous membranes are moist.   Eyes:      Extraocular Movements: Extraocular movements intact.      Pupils: Pupils are equal, round, and reactive to light.   Cardiovascular:      Rate and Rhythm: Normal rate and regular rhythm.      Pulses: Normal pulses.      Heart sounds: Normal heart sounds.   Pulmonary:      Effort: Pulmonary effort is normal.      Breath sounds: Normal breath sounds.   Chest:   Breasts:     Breasts are symmetrical.      Right: Normal. No swelling, bleeding or inverted nipple.      Left: Normal. No swelling, bleeding or inverted nipple.   Abdominal:      General: Abdomen is flat. Bowel sounds are normal. There is no distension.      Palpations: Abdomen is soft. There is no hepatomegaly, splenomegaly or mass.      Tenderness: There is no abdominal tenderness.      Hernia: There is no hernia in the left inguinal area or right inguinal area.   Genitourinary:     General: Normal vulva.      Pubic Area: No rash or pubic lice.       Labia:         Right: No rash, tenderness, lesion or injury.         Left: No rash, tenderness, lesion or injury.       Urethra: No prolapse, urethral pain, urethral swelling or urethral lesion.      Vagina: Normal. No signs of injury and foreign body. No vaginal discharge, erythema, tenderness, bleeding, lesions or prolapsed vaginal walls.      Cervix: Normal.      Uterus: Normal.       Adnexa: Right adnexa normal and left adnexa normal.        Right: No mass, tenderness or fullness.          Left: No mass,  tenderness or fullness.        Rectum: Normal.      Comments: External genitalia normal limits the vagina is clean there is some atrophic changes.  Consistent with menopause.  Cervix present Pap smear was not performed uterus is small mobile nontender there is no evidence of prolapse.  The adnexa clear bilaterally.  Urethra bladder normal working relationship.  Musculoskeletal:         General: Normal range of motion.      Cervical back: Normal range of motion and neck supple.   Lymphadenopathy:      Lower Body: No right inguinal adenopathy. No left inguinal adenopathy.   Skin:     General: Skin is warm and dry.   Neurological:      General: No focal deficit present.      Mental Status: She is alert and oriented to person, place, and time.   Psychiatric:         Mood and Affect: Mood normal.         Behavior: Behavior normal.         Thought Content: Thought content normal.       Administrative Statements

## 2024-10-03 PROBLEM — J30.89 ALLERGIC RHINITIS DUE TO MOLD: Status: ACTIVE | Noted: 2024-10-03

## 2024-10-03 PROBLEM — T36.0X5A PENICILLIN ADVERSE REACTION: Status: ACTIVE | Noted: 2024-10-03

## 2024-10-03 PROBLEM — J30.81 ALLERGIC RHINITIS DUE TO ANIMAL HAIR AND DANDER: Status: ACTIVE | Noted: 2024-10-03

## 2024-10-03 PROBLEM — J30.1 SEASONAL ALLERGIC RHINITIS DUE TO POLLEN: Status: ACTIVE | Noted: 2024-10-03

## 2024-10-03 PROBLEM — H10.13 ALLERGIC CONJUNCTIVITIS, BILATERAL: Status: ACTIVE | Noted: 2024-10-03

## 2024-11-02 PROBLEM — H10.13 ALLERGIC CONJUNCTIVITIS, BILATERAL: Status: RESOLVED | Noted: 2024-10-03 | Resolved: 2024-11-02

## 2024-11-04 ENCOUNTER — OFFICE VISIT (OUTPATIENT)
Age: 64
End: 2024-11-04
Payer: COMMERCIAL

## 2024-11-04 VITALS — HEIGHT: 64 IN | BODY MASS INDEX: 35.17 KG/M2 | WEIGHT: 206 LBS

## 2024-11-04 DIAGNOSIS — M99.05 SEGMENTAL DYSFUNCTION OF PELVIC REGION: ICD-10-CM

## 2024-11-04 DIAGNOSIS — M99.04 SEGMENTAL DYSFUNCTION OF SACRAL REGION: ICD-10-CM

## 2024-11-04 DIAGNOSIS — M99.03 SEGMENTAL DYSFUNCTION OF LUMBAR REGION: ICD-10-CM

## 2024-11-04 DIAGNOSIS — M54.50 ACUTE BILATERAL LOW BACK PAIN WITHOUT SCIATICA: Primary | ICD-10-CM

## 2024-11-04 PROCEDURE — 98941 CHIROPRACT MANJ 3-4 REGIONS: CPT | Performed by: CHIROPRACTOR

## 2024-11-04 PROCEDURE — 99203 OFFICE O/P NEW LOW 30 MIN: CPT | Performed by: CHIROPRACTOR

## 2024-11-04 NOTE — PROGRESS NOTES
Assessment/Plan:     1. Segmental dysfunction of sacral region        2. Bilateral low back pain without sciatica        3. Segmental dysfunction of lumbar region        4. Segmental dysfunction of pelvic region            Review of Diagnostic Studies and Office Notes:    No results found for this or any previous visit.     Decision and Treatment Plan:    I reviewed my findings with the patient today.  Patient was interested in receiving chiropractic care and was treated today.  We will treat the patient 1-2 times per week for the next three weeks and re-evaluate. If there is improvement in symptoms and objective findings, frequency will be reduced.       The patient will be treated with conservative chiropractic care including myofascial release, joint mobilization, and a home stretching and icing program.    The patient was taught a stretch today. The patient was advised to do the stretch for 3 sets of 15-20 seconds several times per day.The need to stretch to the point of tension only was discussed.     Patient Instructions:    Return for treatment once next week.   Ice 15 minutes as needed for post treatment soreness.  Reviewed stretching to be done twice daily.       TIME/Reviewed with Patient:    At least 31 minutes of time was spent with the patient during the consultation including the patient's history/current complaints, physical exam, reviewing the diagnostic report, reviewing home instruction/reducing risk factors with the patient, reviewing my findings with the patient, and discussing the treatment with the patient.     This is a separate identifiable portion of today's visit from the E and M code billed.    Treatment today consisted of myofascial release to the lumbar paraspinals, quadratus lumborum, gluteus medius bilaterally.   Manipulation was performed to the right sacroiliac joint, L5-S1, L4-5 utilizing Velasquez drop technique as well as gentle side-lying flexion distraction.  No HVLA was  "performed.    Subjective:      Cecilia Hernandez is a 64 y.o. female presents today for chiropractic evaluation and possible treatment.  She has chief complaint of lower back pain which is more right-sided.  She states she started having low back pain approximately 2 months ago.  She does not recall a specific injury or incident of onset.  The pain does not go down her legs.  She denies numbness or tingling lower extremities.  She describes the pain as achy tight and stiff.  Occasionally with certain movements she gets a sharp pain.  She has had treatment by myself in the past for lower back pain.  It has been a few years since she has had treatment.    She denies fever, chills, night sweats, recent unexplained weight loss, changes in bowel/bladder habits, urinary incontinence or retention.    History of positive MINE.  Was diagnosed with Lupus.  Sees Anita Aguayo PA-C at [facility].         Objective:  Visit Vitals  Ht 5' 4\" (1.626 m)   Wt 93.4 kg (206 lb)   BMI 35.36 kg/m²   OB Status Postmenopausal   Smoking Status Never   BSA 1.98 m²        Appearance: Well developed, well nourished, and in no acute distress    Alert and oriented x 3    Mood/Affect: calm and pleasant    Gait/Posture:    Gait: Normal    Antalgic posture: None    Lordosis: Lumbar- normal    Kyphosis: Thoracic- normal    Lower extremity reflexes:    Deep tendon reflexes were normal and symmetrical in the bilateral lower extremities.    Lower Extremity Muscle Testing:    Muscle testing of the bilateral lower extremities was normal and graded +5/5.    Lumbar Orthopedic Tests:    Supine Straight Leg Raise was positive on the Right for production of lower back pain..    Supine Straight Leg Raise was negative  on the Left.    Sang Test was positive bilaterally.    Ely's test is positive on the right, negative on the left.     Active Lumbar Ranges of Motion:    Lumbar range of motion examination shows flexion to be 25% restricted with pain.  Extension is 30% " restricted with pain.  Right lateral bending causes some pulling on the right side.  Right lateral bending is mildly painful.    Palpation:  Moderate palpable spasm and tenderness is noted in the lumbar paraspinals bilaterally, quadratus lumborum more so on the right, gluteus medius and piriformis more so on the right.    Mild pelvic obliquity is noted.  Joint dysfunction is present at the right sacroiliac joint, L5-S1, L4-5.    Dictation voice to text software has been used in the creation of this document. Please consider this in light of any contextual or grammatical errors.

## 2024-11-11 ENCOUNTER — PROCEDURE VISIT (OUTPATIENT)
Age: 64
End: 2024-11-11
Payer: COMMERCIAL

## 2024-11-11 VITALS — BODY MASS INDEX: 35.17 KG/M2 | HEIGHT: 64 IN | WEIGHT: 206 LBS

## 2024-11-11 DIAGNOSIS — M99.04 SEGMENTAL DYSFUNCTION OF SACRAL REGION: ICD-10-CM

## 2024-11-11 DIAGNOSIS — M99.03 SEGMENTAL DYSFUNCTION OF LUMBAR REGION: ICD-10-CM

## 2024-11-11 DIAGNOSIS — M54.50 ACUTE BILATERAL LOW BACK PAIN WITHOUT SCIATICA: Primary | ICD-10-CM

## 2024-11-11 DIAGNOSIS — M99.05 SEGMENTAL DYSFUNCTION OF PELVIC REGION: ICD-10-CM

## 2024-11-11 PROCEDURE — 98941 CHIROPRACT MANJ 3-4 REGIONS: CPT | Performed by: CHIROPRACTOR

## 2024-11-11 NOTE — PROGRESS NOTES
Assessment:     1. Bilateral low back pain without sciatica        2. Segmental dysfunction of sacral region        3. Segmental dysfunction of lumbar region        4. Segmental dysfunction of pelvic region            Condition is the same    PLAN/TREATMENT:    Return for treatment once next week.   Continue using ice 15 minutes as needed.     Subjective:    Hortencia is here today with a complaint of LBP. She felt a little temporary improvement after last visit.   Patient is feeling the same after last visit.    Objective:  Moderate palpable spasm and tenderness is noted in the lumbar paraspinals bilaterally, quadratus lumborum more so on the right, gluteus medius and piriformis more so on the right.    Mild pelvic obliquity is noted.  Joint dysfunction is present at the right sacroiliac joint, L5-S1, L4-5.

## 2024-11-20 ENCOUNTER — PROCEDURE VISIT (OUTPATIENT)
Age: 64
End: 2024-11-20
Payer: COMMERCIAL

## 2024-11-20 VITALS — BODY MASS INDEX: 35.17 KG/M2 | WEIGHT: 206 LBS | HEIGHT: 64 IN

## 2024-11-20 DIAGNOSIS — M99.04 SEGMENTAL DYSFUNCTION OF SACRAL REGION: ICD-10-CM

## 2024-11-20 DIAGNOSIS — M99.03 SEGMENTAL DYSFUNCTION OF LUMBAR REGION: ICD-10-CM

## 2024-11-20 DIAGNOSIS — M54.50 ACUTE BILATERAL LOW BACK PAIN WITHOUT SCIATICA: Primary | ICD-10-CM

## 2024-11-20 DIAGNOSIS — M99.05 SEGMENTAL DYSFUNCTION OF PELVIC REGION: ICD-10-CM

## 2024-11-20 PROCEDURE — 98941 CHIROPRACT MANJ 3-4 REGIONS: CPT | Performed by: CHIROPRACTOR

## 2024-11-20 NOTE — PROGRESS NOTES
Assessment:     1. Bilateral low back pain without sciatica        2. Segmental dysfunction of sacral region        3. Segmental dysfunction of lumbar region        4. Segmental dysfunction of pelvic region            Condition is the same    PLAN/TREATMENT:    Return for treatment once next week.   Continue using ice 15 minutes as needed.     Treatment: Myofascial release to the lumbar paraspinals, quadratus lumborum, gluteus medius bilaterally.   Manipulation was performed to the right sacroiliac joint, L5-S1, L4-5 utilizing Velasquez drop technique as well as gentle side-lying flexion distraction.  No HVLA was performed.    Subjective:  Hortencia is here today with a complaint of LBP. She felt a little temporary improvement after last visit. Feels more of a soreness in her LB on the right.   Patient is feeling the same after last visit.    Objective:  Moderate palpable spasm and tenderness is noted in the lumbar paraspinals bilaterally, quadratus lumborum more so on the right, gluteus medius and piriformis more so on the right.    Mild pelvic obliquity is noted.  Joint dysfunction is present at the right sacroiliac joint, L5-S1, L4-5.

## 2024-12-10 ENCOUNTER — PROCEDURE VISIT (OUTPATIENT)
Age: 64
End: 2024-12-10
Payer: COMMERCIAL

## 2024-12-10 VITALS — BODY MASS INDEX: 35.17 KG/M2 | WEIGHT: 206 LBS | HEIGHT: 64 IN

## 2024-12-10 DIAGNOSIS — M99.04 SEGMENTAL DYSFUNCTION OF SACRAL REGION: ICD-10-CM

## 2024-12-10 DIAGNOSIS — M99.03 SEGMENTAL DYSFUNCTION OF LUMBAR REGION: ICD-10-CM

## 2024-12-10 DIAGNOSIS — M99.05 SEGMENTAL DYSFUNCTION OF PELVIC REGION: ICD-10-CM

## 2024-12-10 DIAGNOSIS — M54.50 ACUTE BILATERAL LOW BACK PAIN WITHOUT SCIATICA: Primary | ICD-10-CM

## 2024-12-10 PROCEDURE — 98941 CHIROPRACT MANJ 3-4 REGIONS: CPT | Performed by: CHIROPRACTOR

## 2024-12-10 NOTE — PROGRESS NOTES
Assessment:     1. Bilateral low back pain without sciatica        2. Segmental dysfunction of sacral region        3. Segmental dysfunction of lumbar region        4. Segmental dysfunction of pelvic region            Condition is improving.     PLAN/TREATMENT:    Return for treatment in 1-2 weeks.   Continue using ice 15 minutes as needed.     Treatment: Myofascial release to the lumbar paraspinals, quadratus lumborum, gluteus medius bilaterally.   Manipulation was performed to the right sacroiliac joint, L5-S1, L4-5 utilizing Velasquez drop technique as well as gentle side-lying flexion distraction.  No HVLA was performed.    Subjective:  Hortencia is here today with a complaint of LBP. Pain is much less frequent.  Has right LBP after long periods of standing.      Objective:  Moderate palpable spasm and tenderness is noted in the lumbar paraspinals bilaterally, quadratus lumborum more so on the right, gluteus medius and piriformis more so on the right.    Mild pelvic obliquity is noted.  Joint dysfunction is present at the right sacroiliac joint, L5-S1, L4-5.

## 2025-01-06 ENCOUNTER — PROCEDURE VISIT (OUTPATIENT)
Age: 65
End: 2025-01-06
Payer: COMMERCIAL

## 2025-01-06 DIAGNOSIS — M99.05 SEGMENTAL DYSFUNCTION OF PELVIC REGION: ICD-10-CM

## 2025-01-06 DIAGNOSIS — M54.50 ACUTE BILATERAL LOW BACK PAIN WITHOUT SCIATICA: Primary | ICD-10-CM

## 2025-01-06 DIAGNOSIS — M99.04 SEGMENTAL DYSFUNCTION OF SACRAL REGION: ICD-10-CM

## 2025-01-06 DIAGNOSIS — M99.03 SEGMENTAL DYSFUNCTION OF LUMBAR REGION: ICD-10-CM

## 2025-01-06 PROCEDURE — 97035 APP MDLTY 1+ULTRASOUND EA 15: CPT | Performed by: CHIROPRACTOR

## 2025-01-06 PROCEDURE — 98941 CHIROPRACT MANJ 3-4 REGIONS: CPT | Performed by: CHIROPRACTOR

## 2025-01-06 NOTE — PROGRESS NOTES
Assessment:     1. Bilateral low back pain without sciatica  XR spine lumbar minimum 4 views non injury      2. Segmental dysfunction of sacral region        3. Segmental dysfunction of lumbar region        4. Segmental dysfunction of pelvic region            Condition is improving.     PLAN/TREATMENT:    Return for treatment later this week or early next week.   Continue using ice 15 minutes as needed.   Ordered x-rays to be done of the lumbar spine to assess for degenerative changes.     Treatment: Myofascial release to the lumbar paraspinals, quadratus lumborum, gluteus medius bilaterally.   Manipulation was performed to the right sacroiliac joint, L5-S1, L4-5 utilizing Velasquez drop technique as well as gentle side-lying flexion distraction.  No HVLA was performed.    Subjective:  Hortencia is here today with a complaint of LBP. Pain has persisted and is more right sided.  Denies leg symptoms other than some mild right buttock pain.    She states she saw her PCP recently and had blood work done Was told she had an elevated inflammatory markers. She states she is due to see rheumatology in a few weeks.     Objective:  Moderate palpable spasm and tenderness is noted in the lumbar paraspinals bilaterally, quadratus lumborum more so on the right, gluteus medius and piriformis more so on the right.    Mild pelvic obliquity is noted.  Joint dysfunction is present at the right sacroiliac joint, L5-S1, L4-5.    I cannot locate the recent lab work, or PCP visit as it is not populated in EP[employer].  She has a history of elevated CRP and Sed. Rate in the past. (1/2024, 2/2024)

## 2025-01-09 ENCOUNTER — APPOINTMENT (OUTPATIENT)
Age: 65
End: 2025-01-09
Payer: COMMERCIAL

## 2025-01-09 PROCEDURE — 72110 X-RAY EXAM L-2 SPINE 4/>VWS: CPT

## 2025-01-14 ENCOUNTER — PROCEDURE VISIT (OUTPATIENT)
Age: 65
End: 2025-01-14
Payer: COMMERCIAL

## 2025-01-14 DIAGNOSIS — M47.816 FACET ARTHRITIS OF LUMBAR REGION: ICD-10-CM

## 2025-01-14 DIAGNOSIS — M99.04 SEGMENTAL DYSFUNCTION OF SACRAL REGION: ICD-10-CM

## 2025-01-14 DIAGNOSIS — M51.360 DEGENERATION OF INTERVERTEBRAL DISC OF LUMBAR REGION WITH DISCOGENIC BACK PAIN: ICD-10-CM

## 2025-01-14 DIAGNOSIS — M99.03 SEGMENTAL DYSFUNCTION OF LUMBAR REGION: ICD-10-CM

## 2025-01-14 DIAGNOSIS — M99.05 SEGMENTAL DYSFUNCTION OF PELVIC REGION: ICD-10-CM

## 2025-01-14 DIAGNOSIS — M54.50 ACUTE BILATERAL LOW BACK PAIN WITHOUT SCIATICA: Primary | ICD-10-CM

## 2025-01-14 PROCEDURE — 98941 CHIROPRACT MANJ 3-4 REGIONS: CPT | Performed by: CHIROPRACTOR

## 2025-01-14 NOTE — PROGRESS NOTES
Assessment:     1. Bilateral low back pain without sciatica        2. Segmental dysfunction of sacral region        3. Segmental dysfunction of lumbar region        4. Segmental dysfunction of pelvic region        5. Facet arthritis of lumbar region        6. Degeneration of intervertebral disc of lumbar region with discogenic back pain            Condition is improving.     PLAN/TREATMENT:  Advised to consider PT for strengthening/conditioning.  Will talk to PT at work.  Consider getting referral from PCP and/or going to [facility] if low back pain persists.  Return for treatment once next week.   Continue using ice 15 minutes as needed.     Treatment: Myofascial release to the lumbar paraspinals, quadratus lumborum, gluteus medius bilaterally.   Manipulation was performed to the right sacroiliac joint, L5-S1, L4-5 utilizing Velasquez drop technique as well as gentle side-lying flexion distraction.  No HVLA was performed.    Subjective:  Hortencia is here today with a complaint of LBP.  She states she felt some improvement after her last visit but then after shoveling snow the pain returned.  Did get x-rays done.    Objective:  Moderate palpable spasm and tenderness is noted in the lumbar paraspinals bilaterally, quadratus lumborum more so on the right, gluteus medius and piriformis more so on the right.    Mild pelvic obliquity is noted.  Joint dysfunction is present at the right sacroiliac joint, L5-S1, L4-5.      Study Result    Narrative & Impression   XR SPINE LUMBAR MINIMUM 4 VIEWS NON INJURY     INDICATION: M54.50: Low back pain, unspecified.     COMPARISON: None     FINDINGS:     No acute fracture. Intact pedicles.     Five non-rib-bearing lumbar vertebral bodies.     Degenerative dextroscoliosis.     Intervertebral disc space narrowing at L1-2 to L5-S1 with small endplate osteophytes and facet arthropathy.     No instability on flexion/extension views.     Unremarkable soft tissues.     IMPRESSION:     No acute  osseous abnormality.     Degenerative changes as described.      I reviewed the above report and images with the patient.

## 2025-01-23 ENCOUNTER — PROCEDURE VISIT (OUTPATIENT)
Age: 65
End: 2025-01-23
Payer: COMMERCIAL

## 2025-01-23 VITALS — WEIGHT: 206 LBS | BODY MASS INDEX: 35.17 KG/M2 | HEIGHT: 64 IN

## 2025-01-23 DIAGNOSIS — M47.816 FACET ARTHRITIS OF LUMBAR REGION: ICD-10-CM

## 2025-01-23 DIAGNOSIS — M99.04 SEGMENTAL DYSFUNCTION OF SACRAL REGION: ICD-10-CM

## 2025-01-23 DIAGNOSIS — M99.05 SEGMENTAL DYSFUNCTION OF PELVIC REGION: ICD-10-CM

## 2025-01-23 DIAGNOSIS — M54.50 ACUTE BILATERAL LOW BACK PAIN WITHOUT SCIATICA: Primary | ICD-10-CM

## 2025-01-23 DIAGNOSIS — M51.360 DEGENERATION OF INTERVERTEBRAL DISC OF LUMBAR REGION WITH DISCOGENIC BACK PAIN: ICD-10-CM

## 2025-01-23 DIAGNOSIS — M99.03 SEGMENTAL DYSFUNCTION OF LUMBAR REGION: ICD-10-CM

## 2025-01-23 PROCEDURE — 98941 CHIROPRACT MANJ 3-4 REGIONS: CPT | Performed by: CHIROPRACTOR

## 2025-01-23 NOTE — PROGRESS NOTES
Assessment:     1. Bilateral low back pain without sciatica        2. Segmental dysfunction of sacral region        3. Segmental dysfunction of lumbar region        4. Segmental dysfunction of pelvic region        5. Facet arthritis of lumbar region        6. Degeneration of intervertebral disc of lumbar region with discogenic back pain            Condition is the same.     PLAN/TREATMENT:  Return for treatment once next week.   Continue using ice 15 minutes as needed.     Treatment: Myofascial release to the lumbar paraspinals, quadratus lumborum, gluteus medius bilaterally.   Manipulation was performed to the right sacroiliac joint, L5-S1, L4-5 utilizing Velasquez drop technique as well as gentle side-lying flexion distraction.  No HVLA was performed.    Subjective:  Hortencia is here today with a complaint of LBP.  She states she feels ongoing soreness in LB.  She spoke to PT at work and needs a referral for LB PT.      Objective:  Moderate palpable spasm and tenderness is noted in the lumbar paraspinals bilaterally, quadratus lumborum more so on the right, gluteus medius and piriformis more so on the right.    Mild pelvic obliquity is noted.  Joint dysfunction is present at the right sacroiliac joint, L5-S1, L4-5.

## 2025-01-30 ENCOUNTER — PROCEDURE VISIT (OUTPATIENT)
Age: 65
End: 2025-01-30
Payer: COMMERCIAL

## 2025-01-30 VITALS — BODY MASS INDEX: 35.17 KG/M2 | HEIGHT: 64 IN | WEIGHT: 206 LBS

## 2025-01-30 DIAGNOSIS — M54.50 ACUTE BILATERAL LOW BACK PAIN WITHOUT SCIATICA: Primary | ICD-10-CM

## 2025-01-30 DIAGNOSIS — M51.360 DEGENERATION OF INTERVERTEBRAL DISC OF LUMBAR REGION WITH DISCOGENIC BACK PAIN: ICD-10-CM

## 2025-01-30 DIAGNOSIS — M99.03 SEGMENTAL DYSFUNCTION OF LUMBAR REGION: ICD-10-CM

## 2025-01-30 DIAGNOSIS — M99.05 SEGMENTAL DYSFUNCTION OF PELVIC REGION: ICD-10-CM

## 2025-01-30 DIAGNOSIS — M99.04 SEGMENTAL DYSFUNCTION OF SACRAL REGION: ICD-10-CM

## 2025-01-30 DIAGNOSIS — M47.816 FACET ARTHRITIS OF LUMBAR REGION: ICD-10-CM

## 2025-01-30 PROCEDURE — 98941 CHIROPRACT MANJ 3-4 REGIONS: CPT | Performed by: CHIROPRACTOR

## 2025-01-30 NOTE — PROGRESS NOTES
Assessment:     1. Bilateral low back pain without sciatica        2. Segmental dysfunction of sacral region        3. Segmental dysfunction of lumbar region        4. Segmental dysfunction of pelvic region        5. Facet arthritis of lumbar region        6. Degeneration of intervertebral disc of lumbar region with discogenic back pain              PLAN/TREATMENT:  Return for treatment once next week.   Continue using ice 15 minutes as needed.     Treatment: Myofascial release to the lumbar paraspinals, quadratus lumborum, gluteus medius bilaterally.   Manipulation was performed to the right sacroiliac joint, L5-S1, L4-5 utilizing Velasquez drop technique as well as gentle side-lying flexion distraction.  No HVLA was performed.    Subjective:  Hortencia is here today with a complaint of LBP.  She states she feels better overall. Pain is a little less intense.  Been less active today, which helps.    Objective:  Moderate palpable spasm and tenderness is noted in the lumbar paraspinals bilaterally, quadratus lumborum more so on the right, gluteus medius and piriformis more so on the right.    Mild pelvic obliquity is noted.  Joint dysfunction is present at the right sacroiliac joint, L5-S1, L4-5.

## 2025-02-06 ENCOUNTER — PROCEDURE VISIT (OUTPATIENT)
Age: 65
End: 2025-02-06
Payer: COMMERCIAL

## 2025-02-06 VITALS — HEIGHT: 64 IN | WEIGHT: 206 LBS | BODY MASS INDEX: 35.17 KG/M2

## 2025-02-06 DIAGNOSIS — M99.03 SEGMENTAL DYSFUNCTION OF LUMBAR REGION: ICD-10-CM

## 2025-02-06 DIAGNOSIS — M99.04 SEGMENTAL DYSFUNCTION OF SACRAL REGION: ICD-10-CM

## 2025-02-06 DIAGNOSIS — M51.360 DEGENERATION OF INTERVERTEBRAL DISC OF LUMBAR REGION WITH DISCOGENIC BACK PAIN: ICD-10-CM

## 2025-02-06 DIAGNOSIS — M99.05 SEGMENTAL DYSFUNCTION OF PELVIC REGION: ICD-10-CM

## 2025-02-06 DIAGNOSIS — M47.816 FACET ARTHRITIS OF LUMBAR REGION: ICD-10-CM

## 2025-02-06 DIAGNOSIS — M54.50 ACUTE BILATERAL LOW BACK PAIN WITHOUT SCIATICA: Primary | ICD-10-CM

## 2025-02-06 PROCEDURE — 98941 CHIROPRACT MANJ 3-4 REGIONS: CPT | Performed by: CHIROPRACTOR

## 2025-02-06 NOTE — PROGRESS NOTES
Assessment:     1. Bilateral low back pain without sciatica        2. Degeneration of intervertebral disc of lumbar region with discogenic back pain        3. Facet arthritis of lumbar region        4. Segmental dysfunction of sacral region        5. Segmental dysfunction of lumbar region        6. Segmental dysfunction of pelvic region          Improving.      PLAN/TREATMENT:  Return for treatment once next week.   Continue using ice 15 minutes as needed.     Treatment: Myofascial release to the lumbar paraspinals, quadratus lumborum, gluteus medius bilaterally.   Manipulation was performed to the right sacroiliac joint, L5-S1, L4-5 utilizing Velasquez drop technique as well as gentle side-lying flexion distraction.  No HVLA was performed.    Subjective:  Hortencia is here today with a complaint of LBP. She states she feels better overall. Pain is a little less intense.      Objective:  Mild/m moderate palpable spasm and tenderness is noted in the lumbar paraspinals bilaterally, quadratus lumborum more so on the right, gluteus medius and piriformis more so on the right.    Mild pelvic obliquity is noted.  Joint dysfunction is present at the right sacroiliac joint, L5-S1, L4-5.

## 2025-02-10 ENCOUNTER — PROCEDURE VISIT (OUTPATIENT)
Age: 65
End: 2025-02-10
Payer: COMMERCIAL

## 2025-02-10 VITALS — HEIGHT: 64 IN | BODY MASS INDEX: 35.17 KG/M2 | WEIGHT: 206 LBS

## 2025-02-10 DIAGNOSIS — M99.05 SEGMENTAL DYSFUNCTION OF PELVIC REGION: ICD-10-CM

## 2025-02-10 DIAGNOSIS — M51.360 DEGENERATION OF INTERVERTEBRAL DISC OF LUMBAR REGION WITH DISCOGENIC BACK PAIN: ICD-10-CM

## 2025-02-10 DIAGNOSIS — M99.03 SEGMENTAL DYSFUNCTION OF LUMBAR REGION: ICD-10-CM

## 2025-02-10 DIAGNOSIS — M54.50 ACUTE BILATERAL LOW BACK PAIN WITHOUT SCIATICA: Primary | ICD-10-CM

## 2025-02-10 DIAGNOSIS — M99.04 SEGMENTAL DYSFUNCTION OF SACRAL REGION: ICD-10-CM

## 2025-02-10 DIAGNOSIS — M47.816 FACET ARTHRITIS OF LUMBAR REGION: ICD-10-CM

## 2025-02-10 PROCEDURE — 98941 CHIROPRACT MANJ 3-4 REGIONS: CPT | Performed by: CHIROPRACTOR

## 2025-02-10 NOTE — PROGRESS NOTES
Assessment:     1. Bilateral low back pain without sciatica        2. Degeneration of intervertebral disc of lumbar region with discogenic back pain        3. Facet arthritis of lumbar region        4. Segmental dysfunction of sacral region        5. Segmental dysfunction of lumbar region        6. Segmental dysfunction of pelvic region          Improving.      PLAN/TREATMENT:  Return for treatment in 1-2 weeks.   Continue using ice 15 minutes as needed.     Treatment: Myofascial release to the lumbar paraspinals, quadratus lumborum, gluteus medius bilaterally.   Manipulation was performed to the right sacroiliac joint, L5-S1, L4-5 utilizing Velasquez drop technique as well as gentle side-lying flexion distraction.  No HVLA was performed.    Subjective:  Hortencia is here today with a complaint of LBP. She states she feels better overall. Pain is less intense.     Objective:  Mild/moderate palpable spasm and tenderness is noted in the lumbar paraspinals bilaterally, quadratus lumborum more so on the right, gluteus medius and piriformis more so on the right.    Mild pelvic obliquity is noted.  Joint dysfunction is present at the right sacroiliac joint, L5-S1, L4-5.

## 2025-06-27 ENCOUNTER — PROCEDURE VISIT (OUTPATIENT)
Age: 65
End: 2025-06-27
Payer: COMMERCIAL

## 2025-06-27 DIAGNOSIS — M99.04 SEGMENTAL DYSFUNCTION OF SACRAL REGION: ICD-10-CM

## 2025-06-27 DIAGNOSIS — M51.360 DEGENERATION OF INTERVERTEBRAL DISC OF LUMBAR REGION WITH DISCOGENIC BACK PAIN: ICD-10-CM

## 2025-06-27 DIAGNOSIS — M54.16 LUMBAR RADICULOPATHY: ICD-10-CM

## 2025-06-27 DIAGNOSIS — M47.816 FACET ARTHRITIS OF LUMBAR REGION: ICD-10-CM

## 2025-06-27 DIAGNOSIS — M25.561 ACUTE PAIN OF RIGHT KNEE: ICD-10-CM

## 2025-06-27 DIAGNOSIS — M54.50 ACUTE BILATERAL LOW BACK PAIN WITHOUT SCIATICA: Primary | ICD-10-CM

## 2025-06-27 DIAGNOSIS — M25.551 PAIN OF RIGHT HIP: ICD-10-CM

## 2025-06-27 PROCEDURE — 99213 OFFICE O/P EST LOW 20 MIN: CPT | Performed by: CHIROPRACTOR

## 2025-06-27 PROCEDURE — 98941 CHIROPRACT MANJ 3-4 REGIONS: CPT | Performed by: CHIROPRACTOR

## 2025-06-27 NOTE — PROGRESS NOTES
Assessment:     1. Bilateral low back pain  Ambulatory referral to Spine & Pain Management      2. Lumbar radiculopathy  Ambulatory referral to Spine & Pain Management      3. Facet arthritis of lumbar region        4. Degeneration of intervertebral disc of lumbar region with discogenic back pain  Ambulatory referral to Spine & Pain Management      5. Acute pain of right knee        6. Segmental dysfunction of sacral region        7. Pain of right hip          Improving.    PLAN/TREATMENT:  Return for treatment once next week.   Continue using ice 15 minutes as needed.   Advised to see her knee surgeon ASAP to have the right knee evaluated to make sure there is not any loosening of hardware.    Advised to either keep the appointment with Dr. Walters, physiatry, at [facility] and or she can see Dr. Steele here likely sooner at [facility].    There does appear to be compensatory issues with the lower back and possibly even radicular component.  I can treat the lower back 1-2 times per week for the next 2 to 3 weeks while she is waiting to get evaluated further from her orthopedist and spine and pain.    I offered to take her out of work at this time but she declined.    Treatment: Myofascial release to the lumbar paraspinals, quadratus lumborum, gluteus medius bilaterally.   Manipulation was performed to the right sacroiliac joint, L5-S1, L4-5 utilizing Velasquez drop technique as well as gentle side-lying flexion distraction.  No HVLA was performed.    Subjective:  Hortencia is here today with complaints of right leg pain, right lateral hip and groin pain.  She states that approximately 2 months ago she started having pain in the right lateral hip.  The pain then progressed into the right groin.  She was seen by her PCP on 4/25/2025.  She did have x-rays of the right hip and was referred to an orthopedist.  She saw the orthopedist on 4/30/2025.  She states she got injections into the lateral hip as well as into the hip joint in  the office.  She states the injections did not help.  She was states she was advised to come back to the orthopedist if the injections did not help but she never did.  She then started developing more pain down the right leg and the knee she does get some pain in the right quadriceps and sometimes in the lateral thigh as well as in the.  She states she is having a lot of pain in the knee joint area especially when she ambulates.  Anterior and lateral calf.  She has been favoring the right leg when she ambulates for the past 2 months and tolerating it at work.  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  She states she saw the physical therapist at work and since it appears she has some weakness in the right leg she was referred to physiatry at [facility].  She states she has an appointment with Dr. Walters at the end of July.  She thought she would can here to see if we can help with the back and leg.  The most intense pain when she ambulates and twists is in the knee.  She does have a knee replacement in her right knee done by Dr. Guerrero      Objective:  She ambulates favoring the right hip and knee.    Lumbar range of motion shows there is full flexion pain-free.  Extension is mildly painful at end range.  Left lateral bending causes mild pulling on the right side.  Right lateral bending is mildly painful at the end range.    Straight leg raise on the right is productive of pain in the knee joint region.  Left straight leg raise is negative.    Reflexes at the Achilles reflex are 2+ and equal bilaterally.  Patellar reflexes are not present.  Lower extremity muscle strength testing is 5/5 bilaterally.    Right hip range of motion is mildly limited in internal rotation.  Flexion of the right hip combined with flexion of the knee is mildly painful in both the groin and knee joint.  Right knee range of motion is mildly limited and painful with flexion.    There does appear to be to be an increase in movement of  the right knee joint with crepitus compared to the left which is also replaced.    She does have pain in the knee joint with standing on the right leg, supporting her self by holding the counter and flexing the left hip.    Mild/moderate palpable spasm and tenderness is noted in the lumbar paraspinals bilaterally, quadratus lumborum more so on the right, gluteus medius and piriformis more so on the right.    Mild pelvic obliquity is noted.  Joint dysfunction is present at the right sacroiliac joint, L5-S1, L4-5.

## (undated) DEVICE — MEDI-VAC YANKAUER SUCTION HANDLE W/BULBOUS AND CONTROL VENT: Brand: CARDINAL HEALTH

## (undated) DEVICE — SUT VICRYL 2-0 SH 27 IN UNDYED J417H

## (undated) DEVICE — 3M™ STERI-DRAPE™ UNDER BUTTOCKS DRAPE WITH POUCH 1084: Brand: STERI-DRAPE™

## (undated) DEVICE — SPONGE STICK WITH PVP-I: Brand: KENDALL

## (undated) DEVICE — NEEDLE 21 G X 1 1/2 SAFETY

## (undated) DEVICE — DRAPE EQUIPMENT RF WAND

## (undated) DEVICE — SCD SEQUENTIAL COMPRESSION COMFORT SLEEVE MEDIUM KNEE LENGTH: Brand: KENDALL SCD

## (undated) DEVICE — GLOVE PI ULTRA TOUCH SZ.7.5

## (undated) DEVICE — CYSTO TUBING SINGLE IRRIGATION

## (undated) DEVICE — IV FLUSH NSS 10ML POSIFLUSH

## (undated) DEVICE — BAG URINE DRAINAGE 2000ML ANTI RFLX LF

## (undated) DEVICE — INTENDED FOR TISSUE SEPARATION, AND OTHER PROCEDURES THAT REQUIRE A SHARP SURGICAL BLADE TO PUNCTURE OR CUT.: Brand: BARD-PARKER SAFETY BLADES SIZE 15, STERILE

## (undated) DEVICE — ADHESIVE SKIN HIGH VISCOSITY EXOFIN 1ML

## (undated) DEVICE — ALLENTOWN DR  LUCENTE S LAP PK: Brand: CARDINAL HEALTH

## (undated) DEVICE — CATH FOLEY 18FR 5ML 2 WAY UNCOATED SILICONE

## (undated) DEVICE — TUBING SUCTION 5MM X 12 FT